# Patient Record
Sex: MALE | Race: BLACK OR AFRICAN AMERICAN | NOT HISPANIC OR LATINO | URBAN - METROPOLITAN AREA
[De-identification: names, ages, dates, MRNs, and addresses within clinical notes are randomized per-mention and may not be internally consistent; named-entity substitution may affect disease eponyms.]

---

## 2024-08-14 ENCOUNTER — HOSPITAL ENCOUNTER (EMERGENCY)
Facility: HOSPITAL | Age: 49
Discharge: HOME | End: 2024-08-14
Attending: EMERGENCY MEDICINE
Payer: COMMERCIAL

## 2024-08-14 VITALS
TEMPERATURE: 98 F | RESPIRATION RATE: 18 BRPM | HEART RATE: 89 BPM | WEIGHT: 311.06 LBS | OXYGEN SATURATION: 96 % | DIASTOLIC BLOOD PRESSURE: 91 MMHG | BODY MASS INDEX: 39.92 KG/M2 | SYSTOLIC BLOOD PRESSURE: 164 MMHG | HEIGHT: 74 IN

## 2024-08-14 DIAGNOSIS — M25.579 ANKLE PAIN: ICD-10-CM

## 2024-08-14 PROCEDURE — 99283 EMERGENCY DEPT VISIT LOW MDM: CPT | Mod: 25,ER

## 2024-08-14 NOTE — Clinical Note
"French"Lisset Mcdonald was seen and treated in our emergency department on 8/14/2024.  He may return to work on 08/15/2024.       If you have any questions or concerns, please don't hesitate to call.      John Mascorro MD"

## 2024-08-14 NOTE — ED PROVIDER NOTES
Encounter Date: 8/14/2024       History     Chief Complaint   Patient presents with    Heel Pain     Pt reports heel pain/numbness in L heel for a year with worsening symptoms today. Pt is ambulatory.      The history is provided by the patient.   Leg Pain   There was no injury mechanism. Illness onset: one year ago. The pain is present in the left ankle. The quality of the pain is described as throbbing. Pertinent negatives include no numbness, no loss of motion, no muscle weakness, no loss of sensation and no tingling. He reports no foreign bodies present.     Review of patient's allergies indicates:  No Known Allergies  No past medical history on file.  No past surgical history on file.  No family history on file.     Review of Systems   Constitutional:  Negative for chills, fatigue and fever.   HENT:  Negative for congestion.    Respiratory:  Negative for cough and shortness of breath.    Gastrointestinal:  Negative for diarrhea, nausea and vomiting.   Genitourinary:  Negative for dysuria.   Neurological:  Negative for tingling, weakness and numbness.       Physical Exam     Initial Vitals [08/14/24 0637]   BP Pulse Resp Temp SpO2   (!) 164/91 89 18 98.4 °F (36.9 °C) 96 %      MAP       --         Physical Exam    Constitutional: He appears well-developed and well-nourished. No distress.   HENT:   Head: Normocephalic and atraumatic.   Eyes: Conjunctivae are normal. Pupils are equal, round, and reactive to light.   Neck: Neck supple.   Normal range of motion.  Cardiovascular:  Normal rate, regular rhythm and normal heart sounds.           Pulmonary/Chest: Breath sounds normal.   Abdominal: Abdomen is soft. Bowel sounds are normal.   Musculoskeletal:         General: Normal range of motion.      Cervical back: Normal range of motion and neck supple.      Left ankle: No swelling or deformity. Tenderness present.     Neurological: He is alert and oriented to person, place, and time. No cranial nerve deficit.   Skin:  Skin is warm and dry.   Psychiatric: He has a normal mood and affect.         ED Course   Procedures  Labs Reviewed - No data to display       Imaging Results              X-Ray Ankle Complete Left (In process)                      Medications - No data to display  Medical Decision Making  DDx: ankle pain, sprain    Amount and/or Complexity of Data Reviewed  Radiology: ordered.     Details: No acute findings  Discussion of management or test interpretation with external provider(s): Left lateral ankle pain for the last year.  Doesn't recall any specific incident.  X-ray negative.  Will have follow up with podiatry    Risk  OTC drugs.                                      Clinical Impression:  Final diagnoses:  [M25.579] Ankle pain          ED Disposition Condition    Discharge Stable          ED Prescriptions    None       Follow-up Information       Follow up With Specialties Details Why Contact Info    Podiatry                 John Mascorro MD  08/14/24 [id number]

## 2024-08-16 ENCOUNTER — OFFICE VISIT (OUTPATIENT)
Dept: PODIATRY | Facility: CLINIC | Age: 49
End: 2024-08-16
Payer: COMMERCIAL

## 2024-08-16 VITALS — BODY MASS INDEX: 39.92 KG/M2 | HEIGHT: 74 IN | WEIGHT: 311.06 LBS

## 2024-08-16 DIAGNOSIS — M76.72 PERONEAL TENDONITIS OF LEFT LOWER EXTREMITY: Primary | ICD-10-CM

## 2024-08-16 DIAGNOSIS — M77.52 OS PERONEUM SYNDROME OF LEFT FOOT: ICD-10-CM

## 2024-08-16 DIAGNOSIS — M25.579 ANKLE PAIN: ICD-10-CM

## 2024-08-16 PROCEDURE — 99999 PR PBB SHADOW E&M-EST. PATIENT-LVL III: CPT | Mod: PBBFAC,,, | Performed by: PODIATRIST

## 2024-08-16 RX ORDER — MELOXICAM 15 MG/1
15 TABLET ORAL DAILY
Qty: 30 TABLET | Refills: 1 | Status: SHIPPED | OUTPATIENT
Start: 2024-08-16

## 2024-08-16 NOTE — PROGRESS NOTES
Subjective:       Patient ID: French Mcdonald is a 49 y.o. male.    Chief Complaint: Ankle Pain (Non-diabetic pt wears slippers, pt c/o BL ankle pain 10/10, no PCP, Ref provider John Mascorro )    HPI: French Mcdonald presents to the office today with complaints of moderate to severe pains at the lateral aspect of the left foot.  Patient rates the pain at approximately 10/10.  States severely antalgic gait pattern.  States moderate edema. States weakness with gait.  States this pain has been ongoing for some time, over 1 year..  Denies any recent falls, injuries, trauma.  The patient states the pain does radiate up the outside of the ankle/lower leg at times.  Reports that anti-inflammatories have been successful in reducing edge of the pain.  Recently was seen in the emergency department on 08/14.  X-ray was negative for acute fracture.  Currently resides in [place], [place] but here in [place] for work. No, Primary Doctor is the primary care provider.  Prolonged walking and standing does exacerbate the symptoms.    Review of patient's allergies indicates:  No Known Allergies    History reviewed. No pertinent past medical history.    No family history on file.    Social History     Socioeconomic History    Marital status:    Tobacco Use    Smoking status: Never    Smokeless tobacco: Never     Social Determinants of Health     Financial Resource Strain: Not on File (10/14/2019)    Received from [employer]    Financial Resource Strain     Financial Resource Strain: 0   Food Insecurity: Not on File (10/14/2019)    Received from [employer]    Food Insecurity     Food: 0   Transportation Needs: Not on File (10/14/2019)    Received from [employer]    Transportation Needs     Transportation: 0   Physical Activity: Not on File (10/14/2019)    Received from [employer]    Physical Activity     Physical Activity: 0   Stress: Not on File (10/14/2019)    Received from [employer]    Stress     Stress: 0   Housing Stability: Not on File  "(10/14/2019)    Received from [facility]     Housin       History reviewed. No pertinent surgical history.    Review of Systems       Objective:   Ht 6' 2" (1.88 m)   Wt (!) 141.1 kg (311 lb 1.1 oz)   BMI 39.94 kg/m²     X-Ray Ankle Complete Left  Narrative: EXAMINATION:  XR ANKLE COMPLETE 3 VIEW LEFT    CLINICAL HISTORY:  Pain in unspecified ankle and joints of unspecified foot    TECHNIQUE:  Standard radiography performed.  Three views    COMPARISON:  None    FINDINGS:  Bone density and architecture are normal.  No acute findings.  Impression: Negative exam.    Electronically signed by: Clyde Carrasco MD  Date:    2024  Time:    08:14       Physical Exam    LOWER EXTREMITY PHYSICAL EXAMINATION  ORTHOPEDIC:  Moderate discomfort along the course of the left peroneal tendon.  Mild to moderate edema is noted along the course.  Mild retromalleolar edema noted. No subluxing peroneals are noted. Mild discomfort to palpation lateral malleolus.  Moderate pain to palpation of the 5th metatarsal base.  No discomfort to palpation of 5th metatarsal proximal metaphysis and diaphysis.  There is mild discomfort to the sinus tarsi. There is no tenderness to palpation of the ATFL and no to the CFL and no to the PTFL. Gait pattern is antalgic. MMT with isolation of peroneal tendon, is weak as compared to contralateral.    DERMATOLOGY: Skin is supple, dry and intact. No ecchymosis is noted. No hypertrophic skin formation. No erythema or cellulitis is noted.    NEUROLOGY: Sensation to light touch is intact. Proprioception is intact. Sensation to pin prick is intact. Deep tendon reflexes of the lower extremity are WNL.    VASCULAR: On the left foot, the dorsalis pedis pulse is 2/4 and the posterior tibial pulse is 2/4. Capillary refill time is less than 3 seconds. Hair growth is present on the dorsum of the foot and at the digits. No rubor is present. Proximal to distal temperature is warm to " warm.      Assessment:     1. Peroneal tendonitis of left lower extremity    2. Ankle pain    3. Os peroneum syndrome of left foot        Plan:     Peroneal tendonitis of left lower extremity    Ankle pain  -     Ambulatory referral/consult to Podiatry    Os peroneum syndrome of left foot    Other orders  -     meloxicam (MOBIC) 15 MG tablet; Take 1 tablet (15 mg total) by mouth once daily.  Dispense: 30 tablet; Refill: 1      Thorough discussion is had with the patient today, concerning the diagnosis, its etiology, and the treatment algorithm at present.    Discussed pathology in etiology associated peroneal tendinitis and os peroneum syndrome.    X-rays were reviewed and interpreted showing some enthesopathy at the 5th metatarsal base as well as an os peroneum to the lateral inferior aspect cuboid.    Recommend anti-inflammatories to reduce inflammation and pain to this area.    Cam boot is dispensed to the patient. The Cam boot is appropriately fitted and customized to the patient's lower extremity physique by the LPN/MA. Patient to ambulate with the Cam boot at all times. The patient should not sleep with the device or shower with the device. The patient should exercise caution when driving with the device, if dispensed for right ankle/foot pathology.  Recommend to slowly wean out of cam boot as tolerated    Patient to follow-up as needed for new worsening pathology.          Future Appointments   Date Time Provider Department Center   8/27/2024 11:00 AM Mike Seo MD [facility] UROLOGY [facility]

## 2024-08-27 ENCOUNTER — OFFICE VISIT (OUTPATIENT)
Dept: UROLOGY | Facility: CLINIC | Age: 49
End: 2024-08-27
Payer: COMMERCIAL

## 2024-08-27 ENCOUNTER — LAB VISIT (OUTPATIENT)
Dept: LAB | Facility: HOSPITAL | Age: 49
End: 2024-08-27
Attending: UROLOGY
Payer: COMMERCIAL

## 2024-08-27 VITALS — WEIGHT: 309.06 LBS | BODY MASS INDEX: 39.66 KG/M2 | HEIGHT: 74 IN

## 2024-08-27 DIAGNOSIS — R79.89 LOW TESTOSTERONE: ICD-10-CM

## 2024-08-27 DIAGNOSIS — Z12.5 PROSTATE CANCER SCREENING: ICD-10-CM

## 2024-08-27 DIAGNOSIS — N52.9 ERECTILE DYSFUNCTION, UNSPECIFIED ERECTILE DYSFUNCTION TYPE: ICD-10-CM

## 2024-08-27 DIAGNOSIS — R79.89 LOW TESTOSTERONE: Primary | ICD-10-CM

## 2024-08-27 PROCEDURE — 36415 COLL VENOUS BLD VENIPUNCTURE: CPT | Performed by: UROLOGY

## 2024-08-27 PROCEDURE — 1159F MED LIST DOCD IN RCRD: CPT | Mod: CPTII,S$GLB,, | Performed by: UROLOGY

## 2024-08-27 PROCEDURE — 84153 ASSAY OF PSA TOTAL: CPT | Performed by: UROLOGY

## 2024-08-27 PROCEDURE — 3008F BODY MASS INDEX DOCD: CPT | Mod: CPTII,S$GLB,, | Performed by: UROLOGY

## 2024-08-27 PROCEDURE — 99999 PR PBB SHADOW E&M-EST. PATIENT-LVL III: CPT | Mod: PBBFAC,,, | Performed by: UROLOGY

## 2024-08-27 PROCEDURE — 99204 OFFICE O/P NEW MOD 45 MIN: CPT | Mod: S$GLB,,, | Performed by: UROLOGY

## 2024-08-27 PROCEDURE — 84403 ASSAY OF TOTAL TESTOSTERONE: CPT | Performed by: UROLOGY

## 2024-08-27 PROCEDURE — 1160F RVW MEDS BY RX/DR IN RCRD: CPT | Mod: CPTII,S$GLB,, | Performed by: UROLOGY

## 2024-08-27 PROCEDURE — 82670 ASSAY OF TOTAL ESTRADIOL: CPT | Performed by: UROLOGY

## 2024-08-27 RX ORDER — ANASTROZOLE 1 MG/1
TABLET ORAL
COMMUNITY
Start: 2024-08-09

## 2024-08-27 RX ORDER — TADALAFIL 20 MG/1
20 TABLET ORAL DAILY
Qty: 30 TABLET | Refills: 11 | Status: SHIPPED | OUTPATIENT
Start: 2024-08-27 | End: 2025-08-27

## 2024-08-27 NOTE — PROGRESS NOTES
Chief Complaint:  Low T and LUTS    HPI:   French Mcdonald is a 49 y.o. male that presents today for evaluation and management of low T and LUTS.  Patient has been on testosterone replacement with injections every two weeks for about the last six months, but per his review of care everywhere notes, it appears he has been on them since October last year.  He goes to his primary care doctor's office in [place] for injections every two weeks, but works here during the week.  Has not really noticed an improvement in his energy level or ED since starting.  Is not sure what his last testosterone level was.  Not sure that he has ever had a PSA level.  Also notes issues with nocturia.  Patient works night shift and usually goes to bed around 7:00 a.m..  He notes nocturia times 3-4.  Does not limit his fluids prior to sleep.  He drinks soda and water during the workday.  No weak stream or incomplete emptying.  No family history of  cancers.  IPSS - 3/5/3/2/3/2/4 = 22  QOL - 6(terrible)    PMH:  History reviewed. No pertinent past medical history.    PSH:  Past Surgical History:   Procedure Laterality Date    NASAL SINUS SURGERY         Family History:  Family History   Problem Relation Name Age of Onset    Throat cancer Father      No Known Problems Mother         Social History:  Social History     Tobacco Use    Smoking status: Never    Smokeless tobacco: Never   Substance Use Topics    Alcohol use: Not Currently    Drug use: Never        Review of Systems:  General: No fever, chills  Skin: No rashes  Chest:  Denies cough and sputum production  Heart: Denies chest pain  Resp: Denies dyspnea  Abdomen: Denies diarrhea, abdominal pain, hematemesis, or blood in stool.  Musculoskeletal: No joint stiffness or swelling. Denies back pain.  : see HPI  Neuro: no dizziness or weakness    Allergies:  Patient has no known allergies.    Medications:    Current Outpatient Medications:     anastrozole (ARIMIDEX) 1 mg Tab,  "SMARTSI.5 Tablet(s) By Mouth Every Other Day, Disp: , Rfl:     meloxicam (MOBIC) 15 MG tablet, Take 1 tablet (15 mg total) by mouth once daily., Disp: 30 tablet, Rfl: 1    tadalafiL (CIALIS) 20 MG Tab, Take 1 tablet (20 mg total) by mouth once daily., Disp: 30 tablet, Rfl: 11    Physical Exam:  There were no vitals filed for this visit.  Body mass index is 39.68 kg/m².  General: awake, alert, cooperative  Head: NC/AT  Ears: external ears normal  Eyes: sclera normal  Lungs: normal inspiration, NAD  Heart: well-perfused  Abdomen: Soft, NT, ND  : Normal circ'd phallus, meatus normal in size and position, BL testicles palpable, no masses, nontender, no abnormalities of epididymi  CIRA: Normal rectal tone, no hemorrhoids. Prostate unable to be palpated secondary to patient body habitus  Lymphatic: groin nodes negative    Skin: The skin is warm and dry  Ext: No c/c/e.  Neuro: grossly intact, AOx3    RADIOLOGY:  No recent relevant imaging available for review.    LABS:  I personally reviewed the following lab values:  No results found for: "WBC", "HGB", "HCT", "PLT", "NA", "K", "CL", "CREATININE", "BUN", "CO2", "TSH", "PSA", "INR", "GLUF", "HGBA1C", "MICROALBUR", "CHOL", "TRIG", "HDL", "LDLDIRECT", "ALT", "AST"    Assessment/Plan:   French Mcdonald is a 49 y.o. male with:    Low T - last injection was Friday, obtain T level today as well as estradiol level, message with this result, may need an increase in his dosage although he is taking 300 mg every two weeks    LUTS/nocturia - limit fluids 2-3 hours prior to bed    ED - daily Cialis Rx    - f/u 6 weeks     Mike Seo MD  Urology    "

## 2024-08-28 LAB
COMPLEXED PSA SERPL-MCNC: 0.77 NG/ML (ref 0–4)
ESTRADIOL SERPL-MCNC: <10 PG/ML (ref 11–44)
TESTOST SERPL-MCNC: 1146 NG/DL (ref 304–1227)

## 2024-10-14 RX ORDER — MELOXICAM 15 MG/1
15 TABLET ORAL
Qty: 30 TABLET | Refills: 1 | Status: SHIPPED | OUTPATIENT
Start: 2024-10-14